# Patient Record
Sex: FEMALE | Race: WHITE | NOT HISPANIC OR LATINO | Employment: FULL TIME
[De-identification: names, ages, dates, MRNs, and addresses within clinical notes are randomized per-mention and may not be internally consistent; named-entity substitution may affect disease eponyms.]

---

## 2017-07-15 ENCOUNTER — HEALTH MAINTENANCE LETTER (OUTPATIENT)
Age: 27
End: 2017-07-15

## 2017-11-26 ENCOUNTER — HOSPITAL ENCOUNTER (EMERGENCY)
Facility: CLINIC | Age: 27
Discharge: HOME | End: 2017-11-26
Attending: EMERGENCY MEDICINE | Admitting: EMERGENCY MEDICINE
Payer: COMMERCIAL

## 2017-11-26 ENCOUNTER — APPOINTMENT (OUTPATIENT)
Dept: ULTRASOUND IMAGING | Facility: CLINIC | Age: 27
End: 2017-11-26
Attending: EMERGENCY MEDICINE
Payer: COMMERCIAL

## 2017-11-26 VITALS
RESPIRATION RATE: 16 BRPM | DIASTOLIC BLOOD PRESSURE: 102 MMHG | BODY MASS INDEX: 32.44 KG/M2 | WEIGHT: 190 LBS | HEIGHT: 64 IN | TEMPERATURE: 97.8 F | HEART RATE: 89 BPM | SYSTOLIC BLOOD PRESSURE: 141 MMHG | OXYGEN SATURATION: 98 %

## 2017-11-26 DIAGNOSIS — R10.30 ABDOMINAL PAIN, LOWER: ICD-10-CM

## 2017-11-26 LAB
ALBUMIN UR-MCNC: NEGATIVE MG/DL
APPEARANCE UR: CLEAR
BILIRUB UR QL STRIP: NEGATIVE
COLOR UR AUTO: YELLOW
GLUCOSE UR STRIP-MCNC: NEGATIVE MG/DL
HCG UR QL: NEGATIVE
HGB UR QL STRIP: NEGATIVE
KETONES UR STRIP-MCNC: NEGATIVE MG/DL
LEUKOCYTE ESTERASE UR QL STRIP: NEGATIVE
NITRATE UR QL: NEGATIVE
PH UR STRIP: 5.5 PH (ref 5–7)
RBC #/AREA URNS AUTO: NORMAL /HPF (ref 0–2)
SOURCE: NORMAL
SP GR UR STRIP: 1 (ref 1–1.03)
UROBILINOGEN UR STRIP-MCNC: NORMAL MG/DL (ref 0–2)
WBC #/AREA URNS AUTO: NORMAL /HPF (ref 0–2)

## 2017-11-26 PROCEDURE — 99284 EMERGENCY DEPT VISIT MOD MDM: CPT | Mod: Z6 | Performed by: EMERGENCY MEDICINE

## 2017-11-26 PROCEDURE — 81001 URINALYSIS AUTO W/SCOPE: CPT | Performed by: EMERGENCY MEDICINE

## 2017-11-26 PROCEDURE — 25000128 H RX IP 250 OP 636: Performed by: EMERGENCY MEDICINE

## 2017-11-26 PROCEDURE — 96372 THER/PROPH/DIAG INJ SC/IM: CPT | Performed by: EMERGENCY MEDICINE

## 2017-11-26 PROCEDURE — 99284 EMERGENCY DEPT VISIT MOD MDM: CPT | Mod: 25 | Performed by: EMERGENCY MEDICINE

## 2017-11-26 PROCEDURE — 81025 URINE PREGNANCY TEST: CPT | Performed by: EMERGENCY MEDICINE

## 2017-11-26 PROCEDURE — 76830 TRANSVAGINAL US NON-OB: CPT

## 2017-11-26 RX ORDER — KETOROLAC TROMETHAMINE 30 MG/ML
60 INJECTION, SOLUTION INTRAMUSCULAR; INTRAVENOUS ONCE
Status: COMPLETED | OUTPATIENT
Start: 2017-11-26 | End: 2017-11-26

## 2017-11-26 RX ADMIN — KETOROLAC TROMETHAMINE 60 MG: 30 INJECTION, SOLUTION INTRAMUSCULAR at 02:26

## 2017-11-26 ASSESSMENT — ENCOUNTER SYMPTOMS
ABDOMINAL PAIN: 0
FEVER: 0
SHORTNESS OF BREATH: 0

## 2017-11-26 NOTE — ED PROVIDER NOTES
History     Chief Complaint   Patient presents with     Abdominal Pain     HPI  Jeremias Davis is a 27 year old female who presents to the emergency Department with complaints of abdominal pain.  Patient had sudden onset of midline, and mild right-sided lower abdominal pain that began suddenly at 10 PM this evening.  Patient was at rest when this occurred, complaining of sharp, stabbing pains.  Patient does have irregular menstrual cycles, and has had prior uterine infection that she was treated with doxycycline.  However, had no prior imaging, and has no history of sexually transmitted infections.  There has been no vaginal bleeding, or vaginal discharge.  Her last ovulation was approximately one week ago according to the patient, and last menstrual cycle was early October.  She denies urinary symptoms.  She does not take any daily medications at this point.  Did take ibuprofen without relief of symptoms.  Denies fever.  No anorexia.  No history of abdominal procedures.  Patient did have routine physical exam yesterday, and no abnormalities were noted, with gonorrhea and chlamydia cultures which were obtained which were negative.  I Reviewed care everywhere records.  Also had CBC, BMP, and wet prep which were obtained which were all negative/normal        Problem List:    Patient Active Problem List    Diagnosis Date Noted     Polycystic ovarian syndrome 02/02/2016     Priority: Medium     Anxiety 04/16/2012     Priority: Medium     CARDIOVASCULAR SCREENING; LDL GOAL LESS THAN 160 04/16/2012     Priority: Medium     Abdominal pain, unspecified abdominal location 04/11/2012     Priority: Medium     Problem list name updated by automated process. Provider to review       Mild major depression (H) 04/11/2012     Priority: Medium        Past Medical History:    No past medical history on file.    Past Surgical History:    No past surgical history on file.    Family History:    Family History   Problem Relation Age  "of Onset     HEART DISEASE Father      DIABETES Maternal Grandmother      Respiratory Maternal Grandfather      emphasema     HEART DISEASE Paternal Grandfather        Social History:  Marital Status:  Single [1]  Social History   Substance Use Topics     Smoking status: Never Smoker     Smokeless tobacco: Never Used     Alcohol use Yes      Comment: q couple months        Medications:      aspirin-acetaminophen-caffeine (EXCEDRIN MIGRAINE) 250-250-65 MG per tablet   IBUPROFEN 200 MG PO TABS         Review of Systems   Constitutional: Negative for fever.   Respiratory: Negative for shortness of breath.    Cardiovascular: Negative for chest pain.   Gastrointestinal: Negative for abdominal pain.   Genitourinary: Positive for pelvic pain.   All other systems reviewed and are negative.      Physical Exam   BP: 147/89  Pulse: 89  Temp: 97.8  F (36.6  C)  Resp: 16  Height: 162.6 cm (5' 4\")  Weight: 86.2 kg (190 lb)  SpO2: 99 %      Physical Exam   BP (!) 141/102  Pulse 89  Temp 97.8  F (36.6  C) (Oral)  Resp 16  Ht 1.626 m (5' 4\")  Wt 86.2 kg (190 lb)  LMP 10/22/2017 (Approximate)  SpO2 98%  BMI 32.61 kg/m2  General: alert, interactive, in no apparent distress  Head: atraumatic  Nose: no rhinorrhea or epistaxis  Ears: no external auditory canal discharge or bleeding.    Eyes: Sclera nonicteric. Conjunctiva noninjected. PERRL, EOMI  Mouth: no tonsillar erythema, edema, or exudate  Neck: supple, no palp LAD  Lungs: CTAB  CV: RRR, S1/S2; peripheral pulses palpable and symmetric  Abdomen: soft, mild suprapubic/rlq tenderness. nd, no guarding or rebound. Positive bowel sounds  Extremities: no cyanosis or edema  Skin: no rash or diaphoresis  Neuro: strength 5/5 in UE and LEs bilaterally, sensation intact to light touch in UE and LEs bilaterally;         ED Course     ED Course     Procedures               Critical Care time:  none               Labs Ordered and Resulted from Time of ED Arrival Up to the Time of " "Departure from the ED   ROUTINE UA WITH MICROSCOPIC   HCG QUALITATIVE URINE     Results for orders placed or performed during the hospital encounter of 11/26/17 (from the past 24 hour(s))   UA with Microscopic   Result Value Ref Range    Color Urine Yellow     Appearance Urine Clear     Glucose Urine Negative NEG^Negative mg/dL    Bilirubin Urine Negative NEG^Negative    Ketones Urine Negative NEG^Negative mg/dL    Specific Gravity Urine 1.003 1.003 - 1.035    Blood Urine Negative NEG^Negative    pH Urine 5.5 5.0 - 7.0 pH    Protein Albumin Urine Negative NEG^Negative mg/dL    Urobilinogen mg/dL Normal 0.0 - 2.0 mg/dL    Nitrite Urine Negative NEG^Negative    Leukocyte Esterase Urine Negative NEG^Negative    Source Midstream Urine     WBC Urine O - 2 0 - 2 /HPF    RBC Urine O - 2 0 - 2 /HPF   HCG qualitative urine   Result Value Ref Range    HCG Qual Urine Negative NEG^Negative   Pelvis w/Transvaginal    Narrative    US PELVIC COMPLETE WITH TRANSVAGINAL 11/26/2017 2:25 AM     INDICATION: Right pelvic pain. History of ovarian cysts and \"uterine  infection,\" right pelvic pain.     COMPARISON: None.    FINDINGS: Transabdominal, followed by endovaginal ultrasound to better  evaluate ovaries. Uterus measures 6.8 x 3.4 x 4.3 cm. Endometrial  stripe measures 0.8 cm in thickness. No uterine mass. Both ovaries are  visualized and contain small follicles. Doppler color and waveform  analysis demonstrates blood flow to both ovaries . No suspicious  ovarian or adnexal masses. No free fluid.      Impression    IMPRESSION: Negative.    JULIET SOL MD        Assessments & Plan (with Medical Decision Making)  27 year old female , with past medical history significant for irregular menses, and history of prior uterine infection, presenting to the emergency department with sudden onset of suprapubic and right lower abdominal pain.  Symptoms began suddenly.  No fever, chills.  Denies anorexia.  Is slightly tender, and therefore " concern is for possible pregnancy, ectopic pregnancy, UTI, pyelonephritis, viral illness.  No significant right lower lateral abdominal tenderness that would be concerning for appendicitis.  Other items considered include ovarian cysts.  Therefore, ultrasound is ordered of the pelvis, and this shows no acute abnormalities.  Additionally, urinalysis obtained and this is negative, with negative UPT.  I reviewed records from yesterday, and patient had normal laboratory workup, in addition to negative gonorrhea, Chlamydia, in addition to wet prep.  At this point, additional imaging, or further testing I do not feel is indicated at this point.  Encouraged on Tylenol, and ibuprofen as needed at home.  Follow-up if not improved.  Uncertain exact cause of patient's symptoms.       I have reviewed the nursing notes.    I have reviewed the findings, diagnosis, plan and need for follow up with the patient.       New Prescriptions    No medications on file       Final diagnoses:   Abdominal pain, lower       11/26/2017   [facility] EMERGENCY DEPARTMENT     Ramiro Alcocer MD  11/26/17 0258

## 2017-11-26 NOTE — ED AVS SNAPSHOT
[facility] Emergency Department    [address] [address]    Phone:  [phone]    Fax:  [phone]                                       Jeremias Davis   MRN: [id number]    Department:  [facility] Emergency Department   Date of Visit:  11/26/2017           Patient Information     Date Of Birth          1990        Your diagnoses for this visit were:     Abdominal pain, lower        You were seen by Ramiro Alcoecr MD.        Discharge Instructions       Urine test normal.   Ultrasound looked good.          Pelvic Pain, Uncertain Cause    Pelvic pain is pain felt in the lowest part of the belly (abdomen) and between the hipbones. The pain may be acute. This means it occurred suddenly and recently. Or the pain may be chronic. This means it has occurred for 6 months or longer.  There are many possible causes of pelvic pain. The pain may be due to a problem in the female reproductive system (pictured here). Or, it may be due to a problem in the digestive, urinary, or musculoskeletal systems.  Based on your visit today, the exact cause of your pelvic pain is not certain. Your condition does not appear to be serious at this time. But it is important for you to keep watching for any new symptoms or worsening of your condition.  General care  Your healthcare provider may advise a number of ways to help manage your pain. These can include:    Taking over-the-counter pain medicine. Stronger pain medicine may also be prescribed, if needed.    Applying heat to the pelvic area. Use a heating pad or a hot pack. Taking a hot bath may also help.    Getting plenty of rest.    Making certain lifestyle changes. These can include practicing good posture and getting regular exercise. (Studies have shown that these changes help reduce pelvic pain in some women.)    Seeing a physical therapist or pain specialist. These healthcare providers can discuss other ways to manage pain with  you.  Follow-up care  Follow up with your healthcare provider, or as advised.   When to seek medical advice  Call your healthcare provider right away if any of the following occur:    Fever of 100.4 F or higher, or as directed by your healthcare provider    Pain worsens or you have sudden, severe pain or new pain    Nausea, vomiting, sweating, or restlessness    Dizziness or fainting    Unusual vaginal discharge    Abnormal vaginal bleeding (especially bleeding after menopause)  Date Last Reviewed: 6/11/2015 2000-2017 [employer]. [address] [place] [address]. All rights reserved. This information is not intended as a substitute for professional medical care. Always follow your healthcare professional's instructions.          24 Hour Appointment Hotline       To make an appointment at any [facility], call [phone] ([phone]). If you don't have a family doctor or clinic, we will help you find one. [facility] are conveniently located to serve the needs of you and your family.             Review of your medicines      Our records show that you are taking the medicines listed below. If these are incorrect, please call your family doctor or clinic.        Dose / Directions Last dose taken    EXCEDRIN MIGRAINE 250-250-65 MG per tablet   Dose:  2 tablet   Generic drug:  aspirin-acetaminophen-caffeine        Take 2 tablets by mouth as needed.   Refills:  0        ibuprofen 200 MG tablet   Commonly known as:  ADVIL/MOTRIN        400 MG UP TO 4 TIMES DAILY IF NEEDED   Refills:  0                Procedures and tests performed during your visit     HCG qualitative urine    Pelvis w/Transvaginal    UA with Microscopic      Orders Needing Specimen Collection     None      Pending Results     No orders found from 11/24/2017 to 11/27/2017.            Pending Culture Results     No orders found from 11/24/2017 to 11/27/2017.            Pending Results Instructions     If you had  "any lab results that were not finalized at the time of your Discharge, you can call the ED Lab Result RN at [phone]. You will be contacted by this team for any positive Lab results or changes in treatment. The nurses are available 7 days a week from 10A to 6:30P.  You can leave a message 24 hours per day and they will return your call.        Test Results From Your Hospital Stay        11/26/2017  2:44 AM      Component Results     Component Value Ref Range & Units Status    Color Urine Yellow  Final    Appearance Urine Clear  Final    Glucose Urine Negative NEG^Negative mg/dL Final    Negative    Bilirubin Urine Negative NEG^Negative Final    Ketones Urine Negative NEG^Negative mg/dL Final    Specific Gravity Urine 1.003 1.003 - 1.035 Final    Blood Urine Negative NEG^Negative Final    pH Urine 5.5 5.0 - 7.0 pH Final    Protein Albumin Urine Negative NEG^Negative mg/dL Final    Urobilinogen mg/dL Normal 0.0 - 2.0 mg/dL Final    Nitrite Urine Negative NEG^Negative Final    Leukocyte Esterase Urine Negative NEG^Negative Final    Source Midstream Urine  Final    WBC Urine O - 2 0 - 2 /HPF Final    RBC Urine O - 2 0 - 2 /HPF Final         11/26/2017  2:25 AM      Component Results     Component Value Ref Range & Units Status    HCG Qual Urine Negative NEG^Negative Final    This test is for screening purposes.  Results should be interpreted along with   the clinical picture.  Confirmation testing is available if warranted by   ordering BVP685, HCG Quantitative Pregnancy.           11/26/2017  2:39 AM      Narrative     US PELVIC COMPLETE WITH TRANSVAGINAL 11/26/2017 2:25 AM     INDICATION: Right pelvic pain. History of ovarian cysts and \"uterine  infection,\" right pelvic pain.     COMPARISON: None.    FINDINGS: Transabdominal, followed by endovaginal ultrasound to better  evaluate ovaries. Uterus measures 6.8 x 3.4 x 4.3 cm. Endometrial  stripe measures 0.8 cm in thickness. No uterine mass. Both ovaries " "are  visualized and contain small follicles. Doppler color and waveform  analysis demonstrates blood flow to both ovaries . No suspicious  ovarian or adnexal masses. No free fluid.        Impression     IMPRESSION: Negative.    JULIET SOL MD                Thank you for choosing [facility]       Thank you for choosing [facility] for your care. Our goal is always to provide you with excellent care. Hearing back from our patients is one way we can continue to improve our services. Please take a few minutes to complete the written survey that you may receive in the mail after you visit with us. Thank you!        [employer] Information     [employer] lets you send messages to your doctor, view your test results, renew your prescriptions, schedule appointments and more. To sign up, go to [email or web address] . Click on \"Log in\" on the left side of the screen, which will take you to the Welcome page. Then click on \"Sign up Now\" on the right side of the page.     You will be asked to enter the access code listed below, as well as some personal information. Please follow the directions to create your username and password.     Your access code is: [email or web address]  Expires: 2018  2:50 AM     Your access code will  in 90 days. If you need help or a new code, please call your [facility] clinic or [phone].        Care EveryWhere ID     This is your Care EveryWhere ID. This could be used by other organizations to access your [facility] medical records  [id number]        Equal Access to Services     TRUONG REYNAGA : Hadii jasmyne alcocero Songhia, waaxda luqadaha, qaybta kaalmada adereenayada, patito bernstein . So [facility] [phone].    ATENCIÓN: Si habla español, tiene a mead disposición servicios gratuitos de asistencia lingüística. Llame al [phone].    We comply with applicable federal civil rights laws and [place] laws. We do not discriminate on the basis of race, color, national origin, age, " disability, sex, sexual orientation, or gender identity.            After Visit Summary       This is your record. Keep this with you and show to your community pharmacist(s) and doctor(s) at your next visit.

## 2017-11-26 NOTE — ED AVS SNAPSHOT
[facility] Emergency Department    [address] [address]    Phone:  [phone]    Fax:  [phone]                                       Jeremias Davis   MRN: [id number]    Department:  [facility] Emergency Department   Date of Visit:  11/26/2017           After Visit Summary Signature Page     I have received my discharge instructions, and my questions have been answered. I have discussed any challenges I see with this plan with the nurse or doctor.    ..........................................................................................................................................  Patient/Patient Representative Signature      ..........................................................................................................................................  Patient Representative Print Name and Relationship to Patient    ..................................................               ................................................  Date                                            Time    ..........................................................................................................................................  Reviewed by Signature/Title    ...................................................              ..............................................  Date                                                            Time

## 2017-11-26 NOTE — LETTER
November 26, 2017      To Whom It May Concern:      Jeremias Davis was seen in our Emergency Department today, 11/26/17.  I expect her condition to improve over the next 1-2 days.  She may return to work 11/27/17.      Sincerely,        Ramiro Alcocer MD

## 2017-11-26 NOTE — ED NOTES
"Had routine check up including  pap yesterday tonight approx 2200 hrs had sudden onset of RLQ pain and intermittent cramping was at rest when occured  Denies bleeding   Reports using condoms and having 47day cycle salgado snot know of pregnancy possible\"  Took 400mg ibuprofen no relief  Pain has not worsened rather has been persistent  Unable ot provide urine at rooming  "

## 2017-11-26 NOTE — DISCHARGE INSTRUCTIONS
Urine test normal.   Ultrasound looked good.          Pelvic Pain, Uncertain Cause    Pelvic pain is pain felt in the lowest part of the belly (abdomen) and between the hipbones. The pain may be acute. This means it occurred suddenly and recently. Or the pain may be chronic. This means it has occurred for 6 months or longer.  There are many possible causes of pelvic pain. The pain may be due to a problem in the female reproductive system (pictured here). Or, it may be due to a problem in the digestive, urinary, or musculoskeletal systems.  Based on your visit today, the exact cause of your pelvic pain is not certain. Your condition does not appear to be serious at this time. But it is important for you to keep watching for any new symptoms or worsening of your condition.  General care  Your healthcare provider may advise a number of ways to help manage your pain. These can include:    Taking over-the-counter pain medicine. Stronger pain medicine may also be prescribed, if needed.    Applying heat to the pelvic area. Use a heating pad or a hot pack. Taking a hot bath may also help.    Getting plenty of rest.    Making certain lifestyle changes. These can include practicing good posture and getting regular exercise. (Studies have shown that these changes help reduce pelvic pain in some women.)    Seeing a physical therapist or pain specialist. These healthcare providers can discuss other ways to manage pain with you.  Follow-up care  Follow up with your healthcare provider, or as advised.   When to seek medical advice  Call your healthcare provider right away if any of the following occur:    Fever of 100.4 F or higher, or as directed by your healthcare provider    Pain worsens or you have sudden, severe pain or new pain    Nausea, vomiting, sweating, or restlessness    Dizziness or fainting    Unusual vaginal discharge    Abnormal vaginal bleeding (especially bleeding after menopause)  Date Last Reviewed:  6/11/2015 2000-2017 [employer]. [address] [place] [address], [place] [address]. All rights reserved. This information is not intended as a substitute for professional medical care. Always follow your healthcare professional's instructions.

## 2019-11-03 ENCOUNTER — HEALTH MAINTENANCE LETTER (OUTPATIENT)
Age: 29
End: 2019-11-03

## 2020-11-16 ENCOUNTER — HEALTH MAINTENANCE LETTER (OUTPATIENT)
Age: 30
End: 2020-11-16

## 2021-09-18 ENCOUNTER — HEALTH MAINTENANCE LETTER (OUTPATIENT)
Age: 31
End: 2021-09-18

## 2022-01-02 ENCOUNTER — HEALTH MAINTENANCE LETTER (OUTPATIENT)
Age: 32
End: 2022-01-02

## 2022-11-19 ENCOUNTER — HEALTH MAINTENANCE LETTER (OUTPATIENT)
Age: 32
End: 2022-11-19

## 2022-12-28 ENCOUNTER — HOSPITAL ENCOUNTER (EMERGENCY)
Facility: CLINIC | Age: 32
Discharge: HOME | End: 2022-12-28
Attending: EMERGENCY MEDICINE | Admitting: EMERGENCY MEDICINE
Payer: COMMERCIAL

## 2022-12-28 VITALS
BODY MASS INDEX: 38.8 KG/M2 | HEART RATE: 116 BPM | HEIGHT: 63 IN | TEMPERATURE: 99.4 F | RESPIRATION RATE: 23 BRPM | OXYGEN SATURATION: 96 % | WEIGHT: 219 LBS | SYSTOLIC BLOOD PRESSURE: 169 MMHG | DIASTOLIC BLOOD PRESSURE: 103 MMHG

## 2022-12-28 DIAGNOSIS — R00.2 PALPITATIONS: ICD-10-CM

## 2022-12-28 DIAGNOSIS — R00.0 TACHYCARDIA: ICD-10-CM

## 2022-12-28 DIAGNOSIS — K60.2 ANAL FISSURE: ICD-10-CM

## 2022-12-28 LAB
ALBUMIN UR-MCNC: NEGATIVE MG/DL
ANION GAP SERPL CALCULATED.3IONS-SCNC: 9 MMOL/L (ref 7–15)
APPEARANCE UR: CLEAR
BACTERIA #/AREA URNS HPF: ABNORMAL /HPF
BASOPHILS # BLD AUTO: 0.1 10E3/UL (ref 0–0.2)
BASOPHILS NFR BLD AUTO: 0 %
BILIRUB UR QL STRIP: NEGATIVE
BUN SERPL-MCNC: 10 MG/DL (ref 6–20)
CALCIUM SERPL-MCNC: 9.6 MG/DL (ref 8.6–10)
CHLORIDE SERPL-SCNC: 101 MMOL/L (ref 98–107)
COLOR UR AUTO: ABNORMAL
CREAT SERPL-MCNC: 0.82 MG/DL (ref 0.51–0.95)
D DIMER PPP FEU-MCNC: 0.47 UG/ML FEU (ref 0–0.5)
DEPRECATED HCO3 PLAS-SCNC: 24 MMOL/L (ref 22–29)
EOSINOPHIL # BLD AUTO: 0.1 10E3/UL (ref 0–0.7)
EOSINOPHIL NFR BLD AUTO: 0 %
ERYTHROCYTE [DISTWIDTH] IN BLOOD BY AUTOMATED COUNT: 11.5 % (ref 10–15)
FLUAV RNA SPEC QL NAA+PROBE: NEGATIVE
FLUBV RNA RESP QL NAA+PROBE: NEGATIVE
GFR SERPL CREATININE-BSD FRML MDRD: >90 ML/MIN/1.73M2
GLUCOSE SERPL-MCNC: 95 MG/DL (ref 70–99)
GLUCOSE UR STRIP-MCNC: NEGATIVE MG/DL
HCG SERPL QL: NEGATIVE
HCT VFR BLD AUTO: 44.7 % (ref 35–47)
HGB BLD-MCNC: 15.5 G/DL (ref 11.7–15.7)
HGB UR QL STRIP: ABNORMAL
HOLD SPECIMEN: NORMAL
IMM GRANULOCYTES # BLD: 0.1 10E3/UL
IMM GRANULOCYTES NFR BLD: 0 %
KETONES UR STRIP-MCNC: NEGATIVE MG/DL
LEUKOCYTE ESTERASE UR QL STRIP: NEGATIVE
LYMPHOCYTES # BLD AUTO: 1.1 10E3/UL (ref 0.8–5.3)
LYMPHOCYTES NFR BLD AUTO: 7 %
MCH RBC QN AUTO: 30.5 PG (ref 26.5–33)
MCHC RBC AUTO-ENTMCNC: 34.7 G/DL (ref 31.5–36.5)
MCV RBC AUTO: 88 FL (ref 78–100)
MONOCYTES # BLD AUTO: 1 10E3/UL (ref 0–1.3)
MONOCYTES NFR BLD AUTO: 6 %
NEUTROPHILS # BLD AUTO: 13.9 10E3/UL (ref 1.6–8.3)
NEUTROPHILS NFR BLD AUTO: 87 %
NITRATE UR QL: NEGATIVE
NRBC # BLD AUTO: 0 10E3/UL
NRBC BLD AUTO-RTO: 0 /100
PH UR STRIP: 7 [PH] (ref 5–7)
PLATELET # BLD AUTO: 401 10E3/UL (ref 150–450)
POTASSIUM SERPL-SCNC: 3.7 MMOL/L (ref 3.4–5.3)
RBC # BLD AUTO: 5.09 10E6/UL (ref 3.8–5.2)
RBC URINE: 1 /HPF
RSV RNA SPEC NAA+PROBE: NEGATIVE
SARS-COV-2 RNA RESP QL NAA+PROBE: NEGATIVE
SODIUM SERPL-SCNC: 134 MMOL/L (ref 136–145)
SP GR UR STRIP: 1 (ref 1–1.03)
UROBILINOGEN UR STRIP-MCNC: NORMAL MG/DL
WBC # BLD AUTO: 16.2 10E3/UL (ref 4–11)
WBC URINE: 1 /HPF

## 2022-12-28 PROCEDURE — 84703 CHORIONIC GONADOTROPIN ASSAY: CPT | Performed by: EMERGENCY MEDICINE

## 2022-12-28 PROCEDURE — 80048 BASIC METABOLIC PNL TOTAL CA: CPT | Performed by: EMERGENCY MEDICINE

## 2022-12-28 PROCEDURE — 96361 HYDRATE IV INFUSION ADD-ON: CPT | Performed by: EMERGENCY MEDICINE

## 2022-12-28 PROCEDURE — 99284 EMERGENCY DEPT VISIT MOD MDM: CPT | Mod: CS,25 | Performed by: EMERGENCY MEDICINE

## 2022-12-28 PROCEDURE — C9803 HOPD COVID-19 SPEC COLLECT: HCPCS | Performed by: EMERGENCY MEDICINE

## 2022-12-28 PROCEDURE — 258N000003 HC RX IP 258 OP 636: Performed by: EMERGENCY MEDICINE

## 2022-12-28 PROCEDURE — 99285 EMERGENCY DEPT VISIT HI MDM: CPT | Mod: CS | Performed by: EMERGENCY MEDICINE

## 2022-12-28 PROCEDURE — 96360 HYDRATION IV INFUSION INIT: CPT | Performed by: EMERGENCY MEDICINE

## 2022-12-28 PROCEDURE — 36415 COLL VENOUS BLD VENIPUNCTURE: CPT | Performed by: EMERGENCY MEDICINE

## 2022-12-28 PROCEDURE — 85379 FIBRIN DEGRADATION QUANT: CPT | Performed by: EMERGENCY MEDICINE

## 2022-12-28 PROCEDURE — 93010 ELECTROCARDIOGRAM REPORT: CPT | Performed by: EMERGENCY MEDICINE

## 2022-12-28 PROCEDURE — 93005 ELECTROCARDIOGRAM TRACING: CPT | Performed by: EMERGENCY MEDICINE

## 2022-12-28 PROCEDURE — 81001 URINALYSIS AUTO W/SCOPE: CPT | Performed by: EMERGENCY MEDICINE

## 2022-12-28 PROCEDURE — 87637 SARSCOV2&INF A&B&RSV AMP PRB: CPT | Performed by: EMERGENCY MEDICINE

## 2022-12-28 PROCEDURE — 85025 COMPLETE CBC W/AUTO DIFF WBC: CPT | Performed by: EMERGENCY MEDICINE

## 2022-12-28 RX ADMIN — SODIUM CHLORIDE, POTASSIUM CHLORIDE, SODIUM LACTATE AND CALCIUM CHLORIDE 1000 ML: 600; 310; 30; 20 INJECTION, SOLUTION INTRAVENOUS at 16:29

## 2022-12-28 ASSESSMENT — ACTIVITIES OF DAILY LIVING (ADL)
ADLS_ACUITY_SCORE: 35
ADLS_ACUITY_SCORE: 35

## 2022-12-28 NOTE — ED PROVIDER NOTES
History     Chief Complaint   Patient presents with     Tachycardia     106-148 in triage.  Pt used to take metoprolol for palpitations but hasnt for several years. Denies CP and SOA     HPI  Jeremias Zaldivar is a 32 year old female with history of PCOS, anxiety, depression, palpitations who comes to ED for evaluation of elevated heart rate. HR in the 150's and 160's w/ any activity.  Says he was on metoprolol years ago but discontinued use.  Today she reports some generalized body aches and felt cold earlier.  Reports cold symptoms a week ago which have resolved.  No nausea or vomiting.  No recent change in.  No chest pain or pressure.  No history of VTE. Non smoker.     The patient's PMHx, Surgical Hx, Allergies, and Medications were all reviewed with the patient.    Allergies:  Allergies   Allergen Reactions     Tramadol Nausea and Vomiting       Problem List:    Patient Active Problem List    Diagnosis Date Noted     Polycystic ovarian syndrome 02/02/2016     Priority: Medium     Anxiety 04/16/2012     Priority: Medium     CARDIOVASCULAR SCREENING; LDL GOAL LESS THAN 160 04/16/2012     Priority: Medium     Abdominal pain, unspecified abdominal location 04/11/2012     Priority: Medium     Problem list name updated by automated process. Provider to review       Mild major depression (H) 04/11/2012     Priority: Medium        Past Medical History:    No past medical history on file.    Past Surgical History:    No past surgical history on file.    Family History:    Family History   Problem Relation Age of Onset     Heart Disease Father      Diabetes Maternal Grandmother      Respiratory Maternal Grandfather         emphasema     Heart Disease Paternal Grandfather        Social History:  Marital Status:   [2]  Social History     Tobacco Use     Smoking status: Never     Smokeless tobacco: Never   Substance Use Topics     Alcohol use: Yes     Comment: q couple months     Drug use: No        Medications:   "  aspirin-acetaminophen-caffeine (EXCEDRIN MIGRAINE) 250-250-65 MG per tablet  IBUPROFEN 200 MG PO TABS          Review of Systems  A complete review of systems performed and is otherwise negative except as noted in the HPI    Physical Exam   BP: (!) 151/85  Pulse: (!) 148 (106-148)  Temp: 99.4  F (37.4  C)  Resp: 18  Height: 160 cm (5' 3\")  Weight: 99.3 kg (219 lb)  SpO2: 98 %    Physical Exam  GEN: Awake, alert, and cooperative. No acute distress.  HENT: MMM. External ears and nose normal bilaterally.  EYES: EOM intact. Conjunctiva clear. No discharge.   NECK: Symmetric, freely mobile.  No JVD  CV : Tachycardic rate.  Regular rhythm.  No murmurs appreciated  PULM: Normal effort. No wheezes, rales, or rhonchi bilaterally.  ABD: Soft, non-tender, non-distended. No rebound or guarding.   NEURO: Normal speech. Following commands. CN II-XII grossly intact. Answering questions and interacting appropriately.   EXT: No gross deformity.  No lower extremity edema or tenderness to palpation.  INT: Warm. No diaphoresis. Normal color.        ED Course        Procedures         EKG: Interpreted by myself.  Sinus rhythm with rate of 113 bpm.  Normal axis.  Normal R wave progression.  Normal intervals.  No ST segment elevations or depressions.  There is a new Q wave and T wave inversion in lead III when compared to prior EKG dated 4/5/2012.  Impression: sinus tachycardia w/ new Q wave and TWI in III.     Critical Care time:  none               Results for orders placed or performed during the hospital encounter of 12/28/22 (from the past 24 hour(s))   [place] Draw    Narrative    The following orders were created for panel order [place] Draw.  Procedure                               Abnormality         Status                     ---------                               -----------         ------                     Extra Blue Top Tube[[id number]]                                                         Extra Red Top Tube[[id number]]  "                              Final result               Extra Green Top (Lithium...[[id number]]                                                 Extra Purple Top Tube[[id number]]                                                         Please view results for these tests on the individual orders.   CBC with platelets, differential    Narrative    The following orders were created for panel order CBC with platelets, differential.  Procedure                               Abnormality         Status                     ---------                               -----------         ------                     CBC with platelets and d...[[id number]]  Abnormal            Final result                 Please view results for these tests on the individual orders.   Basic metabolic panel   Result Value Ref Range    Sodium 134 (L) 136 - 145 mmol/L    Potassium 3.7 3.4 - 5.3 mmol/L    Chloride 101 98 - 107 mmol/L    Carbon Dioxide (CO2) 24 22 - 29 mmol/L    Anion Gap 9 7 - 15 mmol/L    Urea Nitrogen 10.0 6.0 - 20.0 mg/dL    Creatinine 0.82 0.51 - 0.95 mg/dL    Calcium 9.6 8.6 - 10.0 mg/dL    Glucose 95 70 - 99 mg/dL    GFR Estimate >90 >60 mL/min/1.73m2   Extra Red Top Tube   Result Value Ref Range    Hold Specimen [facility]    CBC with platelets and differential   Result Value Ref Range    WBC Count 16.2 (H) 4.0 - 11.0 10e3/uL    RBC Count 5.09 3.80 - 5.20 10e6/uL    Hemoglobin 15.5 11.7 - 15.7 g/dL    Hematocrit 44.7 35.0 - 47.0 %    MCV 88 78 - 100 fL    MCH 30.5 26.5 - 33.0 pg    MCHC 34.7 31.5 - 36.5 g/dL    RDW 11.5 10.0 - 15.0 %    Platelet Count 401 150 - 450 10e3/uL    % Neutrophils 87 %    % Lymphocytes 7 %    % Monocytes 6 %    % Eosinophils 0 %    % Basophils 0 %    % Immature Granulocytes 0 %    NRBCs per 100 WBC 0 <1 /100    Absolute Neutrophils 13.9 (H) 1.6 - 8.3 10e3/uL    Absolute Lymphocytes 1.1 0.8 - 5.3 10e3/uL    Absolute Monocytes 1.0 0.0 - 1.3 10e3/uL    Absolute Eosinophils 0.1 0.0 - 0.7 10e3/uL    Absolute Basophils  0.1 0.0 - 0.2 10e3/uL    Absolute Immature Granulocytes 0.1 <=0.4 10e3/uL    Absolute NRBCs 0.0 10e3/uL   D dimer quantitative   Result Value Ref Range    D-Dimer Quantitative 0.47 0.00 - 0.50 ug/mL FEU    Narrative    This D-dimer assay is intended for use in conjunction with a clinical pretest probability assessment model to exclude pulmonary embolism (PE) and deep venous thrombosis (DVT) in outpatients suspected of PE or DVT. The cut-off value is 0.50 ug/mL FEU.   HCG qualitative pregnancy (blood)   Result Value Ref Range    hCG Serum Qualitative Negative Negative   Symptomatic Influenza A/B & SARS-CoV2 (COVID-19) Virus PCR Multiplex Nose    Specimen: Nose; Swab   Result Value Ref Range    Influenza A PCR Negative Negative    Influenza B PCR Negative Negative    RSV PCR Negative Negative    SARS CoV2 PCR Negative Negative    Narrative    Testing was performed using the Xpert Xpress CoV2/Flu/RSV Assay on the [employer] GeneXpert Instrument. This test should be ordered for the detection of SARS-CoV-2 and influenza viruses in individuals who meet clinical and/or epidemiological criteria. Test performance is unknown in asymptomatic patients. This test is for in vitro diagnostic use under the FDA EUA for laboratories certified under CLIA to perform high or moderate complexity testing. This test has not been FDA cleared or approved. A negative result does not rule out the presence of PCR inhibitors in the specimen or target RNA in concentration below the limit of detection for the assay. If only one viral target is positive but coinfection with multiple targets is suspected, the sample should be re-tested with another FDA cleared, approved, or authorized test, if coinfection would change clinical management. This test was validated by the [facility]. These laboratories are certified under the Clinical Laboratory Improvement Amendments of 1988 (CLIA-88) as qualified to perform high complexity laboratory  testing.   UA with Microscopic reflex to Culture    Specimen: Urine, Clean Catch   Result Value Ref Range    Color Urine Straw Colorless, Straw, Light Yellow, Yellow    Appearance Urine Clear Clear    Glucose Urine Negative Negative mg/dL    Bilirubin Urine Negative Negative    Ketones Urine Negative Negative mg/dL    Specific Gravity Urine 1.004 1.003 - 1.035    Blood Urine Moderate (A) Negative    pH Urine 7.0 5.0 - 7.0    Protein Albumin Urine Negative Negative mg/dL    Urobilinogen Urine Normal Normal, 2.0 mg/dL    Nitrite Urine Negative Negative    Leukocyte Esterase Urine Negative Negative    Bacteria Urine Few (A) None Seen /HPF    RBC Urine 1 <=2 /HPF    WBC Urine 1 <=5 /HPF    Narrative    Urine Culture not indicated       Medications   lactated ringers BOLUS 1,000 mL (0 mLs Intravenous Stopped 12/28/22 1919)       Assessments & Plan (with Medical Decision Making)   32 year old female with past medical history of palpitations previously on metoprolol, anxiety, with tachycardia with heart rate in the 150s to 160s with any exertion and myalgias.  Cold symptoms over a week ago which have been resolved for more than 5 days.  No nasal congestion, rhinorrhea, sore throat, cough, chest pain or shortness of breath.  Complains that she just feels her heart racing with any activity.  No history of venous thromboembolism.  No clinical signs of DVT.  She does not PERC out because of her elevated heart rate.  EKG obtained and shows sinus tachycardia with a new Q wave and TWI in III compared to EKG from 2012.    Chart review shows that she was seen at cardiology at outside system in 2018 for palpitations.  She did have an echocardiogram however I can only see the first page of the report which does not have a summary or the results.  Patient does tell me that she followed up and was told that it was normal.    Heart rate in the low 100s at rest and would rise up to the low 130s and 140s with exertion.  No clinical signs  of DVT.  D-dimer is not elevated.  Lungs are clear.  No chest pain or dyspnea.  No hypoxia.  Very low suspicion for pulmonary embolism.    Abdomen is soft, nontender, nondistended and no nausea, vomiting or diarrhea.  Greater suspicion for acute abdominal process.    Labs notable for leukocytosis with left shift.  No obvious source of infection.  No skin sores.    Patient did report report possible hemorrhoid.  A chaperoned rectal exam was done and she did have an anal fissure with a small amount of bleeding.  Fissure was posterior.  We did discuss fiber and sitz bath's.    Urinalysis without evidence of acute infection.  SARS-CoV-2 2 and influenza testing negative.    Patient does have signs of a systemic infection but no obvious source.  Only abnormality is her elevation of white blood cell count and elevated heart rate.  She has had issues with tachycardia before and was previously on metoprolol.  She is feeling well and I think given her reassuring evaluation she is appropriate for discharge.  If she develops new or concerning symptoms then I would like her to follow-up for further evaluation.  She is comfortable doing this and is comfortable being discharged to home.            I have reviewed the nursing notes.         Discharge Medication List as of 12/28/2022  7:33 PM          Final diagnoses:   Palpitations   Tachycardia   Anal fissure     Ishaan Constantino MD    12/28/2022   [facility] EMERGENCY DEPT    Disclaimer: This note consists of words and symbols derived from keyboarding and dictation using voice recognition software.  As a result, there may be errors that have gone undetected.  Please consider this when interpreting information found in this note.             Ishaan Constantino MD  12/28/22 [id number]

## 2022-12-29 NOTE — DISCHARGE INSTRUCTIONS
Your evaluation was reassuring today.  You do have signs of an infection but no clear source. If you develop any new symptoms, please follow up either with your primary care provider or in the Emergency Department.         You have an anal fissure. As we discussed, metamucil will be helpful to soften your stools. Soaks in warm bath with also help with symptoms.

## 2023-04-09 ENCOUNTER — HEALTH MAINTENANCE LETTER (OUTPATIENT)
Age: 33
End: 2023-04-09

## 2024-06-16 ENCOUNTER — HEALTH MAINTENANCE LETTER (OUTPATIENT)
Age: 34
End: 2024-06-16

## 2025-06-21 ENCOUNTER — HEALTH MAINTENANCE LETTER (OUTPATIENT)
Age: 35
End: 2025-06-21